# Patient Record
Sex: FEMALE
[De-identification: names, ages, dates, MRNs, and addresses within clinical notes are randomized per-mention and may not be internally consistent; named-entity substitution may affect disease eponyms.]

---

## 2021-11-11 ENCOUNTER — HOSPITAL ENCOUNTER (EMERGENCY)
Dept: HOSPITAL 97 - ER | Age: 26
Discharge: HOME | End: 2021-11-11
Payer: SELF-PAY

## 2021-11-11 VITALS — DIASTOLIC BLOOD PRESSURE: 58 MMHG | SYSTOLIC BLOOD PRESSURE: 111 MMHG | TEMPERATURE: 98.6 F

## 2021-11-11 VITALS — OXYGEN SATURATION: 100 %

## 2021-11-11 DIAGNOSIS — O20.0: Primary | ICD-10-CM

## 2021-11-11 DIAGNOSIS — Z3A.01: ICD-10-CM

## 2021-11-11 LAB
BUN BLD-MCNC: 4 MG/DL (ref 7–18)
GLUCOSE SERPLBLD-MCNC: 92 MG/DL (ref 74–106)
HCG SERPL-ACNC: (no result) MIU/ML (ref 1–3)
HCT VFR BLD CALC: 36.4 % (ref 36–45)
LYMPHOCYTES # SPEC AUTO: 1.9 K/UL (ref 0.7–4.9)
PMV BLD: 9.8 FL (ref 7.6–11.3)
POTASSIUM SERPL-SCNC: 3.4 MMOL/L (ref 3.5–5.1)
RBC # BLD: 3.82 M/UL (ref 3.86–4.86)
SP GR UR: 1.01 (ref 1–1.03)

## 2021-11-11 PROCEDURE — 81003 URINALYSIS AUTO W/O SCOPE: CPT

## 2021-11-11 PROCEDURE — 99284 EMERGENCY DEPT VISIT MOD MDM: CPT

## 2021-11-11 PROCEDURE — 36415 COLL VENOUS BLD VENIPUNCTURE: CPT

## 2021-11-11 PROCEDURE — 76817 TRANSVAGINAL US OBSTETRIC: CPT

## 2021-11-11 PROCEDURE — 80048 BASIC METABOLIC PNL TOTAL CA: CPT

## 2021-11-11 PROCEDURE — 86900 BLOOD TYPING SEROLOGIC ABO: CPT

## 2021-11-11 PROCEDURE — 86901 BLOOD TYPING SEROLOGIC RH(D): CPT

## 2021-11-11 PROCEDURE — 85025 COMPLETE CBC W/AUTO DIFF WBC: CPT

## 2021-11-11 PROCEDURE — 84702 CHORIONIC GONADOTROPIN TEST: CPT

## 2021-11-11 PROCEDURE — 81025 URINE PREGNANCY TEST: CPT

## 2021-11-11 NOTE — EDPHYS
Physician Documentation                                                                           

 [facility]                                                                 

Name: Ann Marie Hernandez                                                                           

Age: 26 yrs                                                                                       

Sex: Female                                                                                       

: 1995                                                                                   

MRN: [id number]                                                                                   

Arrival Date: 2021                                                                          

Time: 12:05                                                                                       

Account#: [id number]                                                                            

Bed 17                                                                                            

Private MD:                                                                                       

ED Physician Ivana Fierro                                                                    

HPI:                                                                                              

                                                                                             

15:32 This 26 yrs old  Female presents to ER via Ambulatory with complaints of Pelvic kb  

      Pain, Abdominal Pain.                                                                       

15:34 The patient presents to the emergency department with abdominal pain, of the left lower kb  

      quadrant, that started at the beginning of pregnancy, described as constant. Pregnancy      

      course: Prenatal care: at a clinic. Previous pregnancies: in previous pregnancies           

      patient has had. Associated signs and symptoms: Pertinent positives: abdominal pain.        

      The patient has not experienced similar symptoms in the past. The patient has been          

      recently seen by a physician:.                                                              

                                                                                                  

OB/GYN:                                                                                           

15:34  3,  0, Living 2, LMP 2021                                          kb  

                                                                                                  

Historical:                                                                                       

- Allergies:                                                                                      

12:34 No Known Allergies;                                                                     ld1 

- Home Meds:                                                                                      

12:34 None [Active];                                                                          ld1 

- PMHx:                                                                                           

12:34 None;                                                                                   ld1 

- PSHx:                                                                                           

12:34 None;                                                                                   ld1 

                                                                                                  

- Immunization history:: Adult Immunizations up to date, Client reports having NOT                

  received the Covid vaccine.                                                                     

- Social history:: Smoking status: Patient denies any tobacco usage or history of.                

  Patient/guardian denies using alcohol.                                                          

                                                                                                  

                                                                                                  

ROS:                                                                                              

15:33 Constitutional: Negative for fever, chills, and weight loss.                            kb  

15:33 Abdomen/GI: Positive for abdominal pain, Negative for nausea, vomiting, and diarrhea.       

15:33 All other systems are negative.                                                             

                                                                                                  

Exam:                                                                                             

15:33 Constitutional:  This is a well developed, well nourished patient who is awake, alert,  kb  

      and in no acute distress. Head/Face:  Normocephalic, atraumatic. ENT:  Moist Mucous         

      membranes Respiratory:  Respirations even and unlabored. No increased work of               

      breathing, no retractions or nasal flaring. Skin:  Warm, dry with normal turgor.            

      Normal color. MS/ Extremity:  Pulses equal, no cyanosis.  Neurovascular intact.  Full,      

      normal range of motion. Neuro:  Awake and alert, GCS 15, oriented to person, place,         

      time, and situation. Moves all extremities. Normal gait. Psych:  Awake, alert, with         

      orientation to person, place and time.  Behavior, mood, and affect are within normal        

      limits.                                                                                     

15:33 Abdomen/GI: Inspection: abdomen appears normal, Bowel sounds: normal, Palpation: soft,      

      in all quadrants, moderate abdominal tenderness, in the left lower quadrant.                

                                                                                                  

Vital Signs:                                                                                      

12:32  / 78; Pulse 83; Resp 18; Temp 98.3(TE); Pulse Ox 100% on R/A; Weight 45.36 kg;   ld1 

      Height 5 ft. 0 in. (152.40 cm); Pain 10/10;                                                 

12:40  / 76; Pulse 80; Resp 18; Pulse Ox 100% ;                                         sl2 

14:30  / 58; Pulse 83; Resp 18; Temp 98.6; Pulse Ox 100% ;                              sl2 

12:32 Body Mass Index 19.53 (45.36 kg, 152.40 cm)                                             ld1 

                                                                                                  

MDM:                                                                                              

12:44 Patient medically screened.                                                             kb  

15:33 Data reviewed: vital signs, nurses notes. Data interpreted: Pulse oximetry: on room air kb  

      is 100 %. Interpretation: normal. Counseling: I had a detailed discussion with the          

      patient and/or guardian regarding: the historical points, exam findings, and any            

      diagnostic results supporting the discharge/admit diagnosis, lab results, radiology         

      results, the need for outpatient follow up, an OB/Gyne specialist, to return to the         

      emergency department if symptoms worsen or persist or if there are any questions or         

      concerns that arise at home. ED course: Educated to follow up for repeat HCG..              

                                                                                                  

                                                                                             

12:44 Order name: Abo/rh Typing; Complete Time: 14:59                                         kb  

                                                                                             

12:44 Order name: Basic Metabolic Panel; Complete Time: 15:13                                 kb  

                                                                                             

12:44 Order name: CBC with Diff; Complete Time: 14:43                                         kb  

                                                                                             

12:44 Order name: Quantitative Hcg; Complete Time: 15:13                                      kb  

                                                                                             

13:00 Order name: Urine Dipstick-Ancillary; Complete Time: 13:02                              EDMS

                                                                                             

13:02 Order name: Urine Pregnancy--Ancillary (enter results); Complete Time: 13:11            eb  

                                                                                             

12:08 Order name: Urine Dipstick-Ancillary (obtain specimen); Complete Time: 14:20            kb  

                                                                                             

12:08 Order name: Urine Pregnancy Test (obtain specimen); Complete Time: 14:20                kb  

                                                                                             

12:44 Order name: IV Saline Lock; Complete Time: 14:19                                        kb  

                                                                                             

12:44 Order name: Labs collected and sent; Complete Time: 14:19                               kb  

                                                                                             

12:44 Order name: NPO; Complete Time: 14:20                                                   kb  

                                                                                             

12:44 Order name: US Transvaginal Ob; Complete Time: 14:17                                    kb  

                                                                                                  

Administered Medications:                                                                         

No medications were administered                                                                  

                                                                                                  

                                                                                                  

Disposition Summary:                                                                              

21 15:22                                                                                    

Discharge Ordered                                                                                 

      Location: Home                                                                          kb  

      Condition: Stable                                                                       kb  

      Diagnosis                                                                                   

        - Threatened                                                                  kb  

      Followup:                                                                               kb  

        - With: Emergency Department                                                               

        - When: As needed                                                                          

        - Reason: Worsening of condition                                                           

      Followup:                                                                               kb  

        - With: Private Physician                                                                  

        - When: 2 - 3 days                                                                         

        - Reason: Recheck today's complaints, Continuance of care, Re-evaluation by your           

      physician                                                                                   

      Discharge Instructions:                                                                     

        - Discharge Summary Sheet                                                             kb  

        - Threatened Miscarriage, Easy-to-Read                                                kb  

      Forms:                                                                                      

        - Medication Reconciliation Form                                                      kb  

        - Thank You Letter                                                                    kb  

        - Antibiotic Education                                                                kb  

        - Prescription Opioid Use                                                             kb  

Addendum:                                                                                         

11/15/2021                                                                                        

     06:39 Co-signature as Attending Physician, Ivana mooney
a2

                                                                                                  

Signatures:                                                                                       

Dispatcher [employer]                           Aida Kramer, SLOAN-C                 SLOAN-Ivana Mittal MD MD   ma2                                                  

Susie Breaux, RN                     RN   ld1                                                  

                                                                                                  

**************************************************************************************************

## 2021-11-11 NOTE — RAD REPORT
EXAM DESCRIPTION:  US - Transvaginal OB - 11/11/2021 1:41 pm

 

CLINICAL HISTORY:  ABD PAIN

 

COMPARISON:  No comparisons

 

FINDINGS:  Normal shaped intrauterine gestational sac is identified in the fundal portion of the endo
metrial cavity. Yolk sac is present. There is no fetal pole. The gestational sac measurements corresp
ond to a 6 week 4 day gestation. A small subchorionic hemorrhage is present.

 

Both ovaries are identifiable and show normal blood flow within the stroma. No ovarian or adnexal mas
s is identifiable. No suspicion for ectopic pregnancy.

 

No blood or fluid in the cul de sac.

 

IMPRESSION:  Six week 4 day sized gestational sac with yolk sac. There is no fetal pole present.

 

No adnexal abnormality to suspect ectopic pregnancy.

 

Miscarriage is most likely given the gestational sac size and absence of fetal pole. Repeat imaging c
ould be performed if there are elevating beta HCG values.

## 2021-11-11 NOTE — ER
Nurse's Notes                                                                                     

 Former[facility]                                                                 

Name: Ann Marie Hernandez                                                                           

Age: 26 yrs                                                                                       

Sex: Female                                                                                       

: 1995                                                                                   

MRN: [id number]                                                                                   

Arrival Date: 2021                                                                          

Time: 12:05                                                                                       

Account#: [id number]                                                                            

Bed 17                                                                                            

Private MD:                                                                                       

Diagnosis: Threatened                                                                     

                                                                                                  

Presentation:                                                                                     

                                                                                             

12:32 Chief complaint: Patient states: 7 weeks pregnant, LLQ pain. PT went to clinic,         ld1 

      received ultrasound - patient stated that the "baby was not seen where it was supposed      

      to be." Level 10 pain. Coronavirus screen: At this time, the client does not indicate       

      any symptoms associated with coronavirus-19. Ebola Screen: No symptoms or risks             

      identified at this time. Initial Sepsis Screen: Does the patient meet any 2 criteria?       

      No. Patient's initial sepsis screen is negative. Does the patient have a suspected          

      source of infection? No. Patient's initial sepsis screen is negative. Risk Assessment:      

      Do you want to hurt yourself or someone else? Patient reports no desire to harm self or     

      others. Onset of symptoms was 2021.                                            

12:32 Method Of Arrival: Ambulatory                                                           ld1 

12:32 Acuity: CHACHA 3                                                                           ld1 

                                                                                                  

Triage Assessment:                                                                                

12:34 General: Appears in no apparent distress. comfortable. General: Appears Behavior is     ld1 

      calm, cooperative, appropriate for age. Pain: Complains of pain in left lower quadrant      

      Pain does not radiate. Pain currently is 10 out of 10 on a pain scale. Quality of pain      

      is described as sharp, stabbing, throbbing, Pain began suddenly, Is continuous. EENT:       

      No deficits noted. Neuro: Level of Consciousness is awake, alert, obeys commands,           

      Oriented to person, place, time, situation, Appropriate for age. Cardiovascular:            

      Capillary refill < 3 seconds Patient's skin is warm and dry. Respiratory: Airway is         

      patent Respiratory effort is even, unlabored, Respiratory pattern is regular,               

      symmetrical. GI: Abdomen is flat, non-distended, Reports lower abdominal pain.              

                                                                                                  

OB/GYN:                                                                                           

15:34  3,  0, Living 2, LMP 2021                                          kb  

                                                                                                  

Historical:                                                                                       

- Allergies:                                                                                      

12:34 No Known Allergies;                                                                     ld1 

- Home Meds:                                                                                      

12:34 None [Active];                                                                          ld1 

- PMHx:                                                                                           

12:34 None;                                                                                   ld1 

- PSHx:                                                                                           

12:34 None;                                                                                   ld1 

                                                                                                  

- Immunization history:: Adult Immunizations up to date, Client reports having NOT                

  received the Covid vaccine.                                                                     

- Social history:: Smoking status: Patient denies any tobacco usage or history of.                

  Patient/guardian denies using alcohol.                                                          

                                                                                                  

                                                                                                  

Assessment:                                                                                       

12:42 General: Appears in no apparent distress. well groomed, well developed, Behavior is     sl2 

      calm, cooperative, appropriate for age.                                                     

12:42 Pain: Denies pain. Neuro: No deficits noted. Cardiovascular: No deficits noted.         sl2 

      Respiratory: No deficits noted. Airway is patent Trachea midline Respiratory effort is      

      even, unlabored, Respiratory pattern is regular, symmetrical. GI: No deficits noted. No     

      signs and/or symptoms were reported involving the gastrointestinal system. Bowel sounds     

      present X 4 quads. Abd is soft and non tender X 4 quads. : No deficits noted. No          

      signs and/or symptoms were reported regarding the genitourinary system. EENT: No            

      deficits noted. No signs and/or symptoms were reported regarding the EENT system. Derm:     

      No deficits noted. No signs and/or symptoms reported regarding the dermatologic system.     

      Musculoskeletal: No deficits noted. No signs and/or symptoms reported regarding the         

      musculoskeletal system.                                                                     

                                                                                                  

Vital Signs:                                                                                      

12:32  / 78; Pulse 83; Resp 18; Temp 98.3(TE); Pulse Ox 100% on R/A; Weight 45.36 kg;   ld1 

      Height 5 ft. 0 in. (152.40 cm); Pain 10/10;                                                 

12:40  / 76; Pulse 80; Resp 18; Pulse Ox 100% ;                                         sl2 

14:30  / 58; Pulse 83; Resp 18; Temp 98.6; Pulse Ox 100% ;                              sl2 

12:32 Body Mass Index 19.53 (45.36 kg, 152.40 cm)                                             ld1 

                                                                                                  

ED Course:                                                                                        

12:05 Patient arrived in ED.                                                                  am2 

12:07 [facility]                                                        kb  

12:07 Ivana Fierro MD is Attending Physician.                                           kb  

12:34 Triage completed.                                                                       ld1 

12:34 Arm band placed on right wrist.                                                         ld1 

12:52 Kinga Kilpatrick, RN is Primary Nurse.                                                   sl2 

13:13 Patient taken to ultrasound.                                                            sl2 

13:42 US Transvaginal Ob In Process Unspecified.                                              EDMS

14:20 Abo/rh Typing Sent.                                                                     [facility] 

14:20 Basic Metabolic Panel Sent.                                                             [facility] 

14:20 CBC with Diff Sent.                                                                     [facility] 

14:20 Quantitative Hcg Sent.                                                                  [facility] 

14:20 Initial lab(s) drawn, by me, sent to lab. T\T\S collected, blood band applied to patient. [facility]

      Inserted saline lock: 22 gauge in left antecubital area, using aseptic technique. Blood     

      collected.                                                                                  

                                                                                                  

Administered Medications:                                                                         

No medications were administered                                                                  

                                                                                                  

                                                                                                  

Outcome:                                                                                          

15: Discharge ordered by MD.                                                                kb  

16:03 Patient left the ED.                                                                    ld1 

                                                                                                  

Signatures:                                                                                       

Dispatcher [employer]                           [facility]                                                 

Aida Kent, JORDANA                 FNP-Ann Marie Wallace                              5                                                  

Ernestine Beltre Lauren, RN                     RN   ld1                                                  

Kinga Kilpatrick RN                     RN   sl2                                                  

                                                                                                  

**************************************************************************************************

## 2021-12-13 ENCOUNTER — HOSPITAL ENCOUNTER (EMERGENCY)
Dept: HOSPITAL 97 - ER | Age: 26
Discharge: HOME | End: 2021-12-13
Payer: SELF-PAY

## 2021-12-13 VITALS — DIASTOLIC BLOOD PRESSURE: 63 MMHG | SYSTOLIC BLOOD PRESSURE: 112 MMHG

## 2021-12-13 VITALS — OXYGEN SATURATION: 100 % | TEMPERATURE: 99.1 F

## 2021-12-13 DIAGNOSIS — O20.0: Primary | ICD-10-CM

## 2021-12-13 LAB
BUN BLD-MCNC: 6 MG/DL (ref 7–18)
GLUCOSE SERPLBLD-MCNC: 102 MG/DL (ref 74–106)
HCG SERPL-ACNC: (no result) MIU/ML (ref 1–3)
HCT VFR BLD CALC: 40 % (ref 36–45)
LYMPHOCYTES # SPEC AUTO: 2.5 K/UL (ref 0.7–4.9)
PMV BLD: 9.8 FL (ref 7.6–11.3)
POTASSIUM SERPL-SCNC: 3.1 MMOL/L (ref 3.5–5.1)
RBC # BLD: 4.21 M/UL (ref 3.86–4.86)
SP GR UR: >1.03 (ref 1–1.03)

## 2021-12-13 PROCEDURE — 81015 MICROSCOPIC EXAM OF URINE: CPT

## 2021-12-13 PROCEDURE — 36415 COLL VENOUS BLD VENIPUNCTURE: CPT

## 2021-12-13 PROCEDURE — 80048 BASIC METABOLIC PNL TOTAL CA: CPT

## 2021-12-13 PROCEDURE — 86901 BLOOD TYPING SEROLOGIC RH(D): CPT

## 2021-12-13 PROCEDURE — 86900 BLOOD TYPING SEROLOGIC ABO: CPT

## 2021-12-13 PROCEDURE — 76817 TRANSVAGINAL US OBSTETRIC: CPT

## 2021-12-13 PROCEDURE — 84702 CHORIONIC GONADOTROPIN TEST: CPT

## 2021-12-13 PROCEDURE — 81025 URINE PREGNANCY TEST: CPT

## 2021-12-13 PROCEDURE — 85025 COMPLETE CBC W/AUTO DIFF WBC: CPT

## 2021-12-13 PROCEDURE — 81003 URINALYSIS AUTO W/O SCOPE: CPT

## 2021-12-13 PROCEDURE — 99284 EMERGENCY DEPT VISIT MOD MDM: CPT

## 2021-12-13 NOTE — ER
Nurse's Notes                                                                                     

 [facility]                                                                 

Name: Ann Marie Hernandez                                                                           

Age: 26 yrs                                                                                       

Sex: Female                                                                                       

: 1995                                                                                   

MRN: [id number]                                                                                   

Arrival Date: 2021                                                                          

Time: 13:35                                                                                       

Account#: [id number]                                                                            

Bed 20                                                                                            

Private MD:                                                                                       

Diagnosis: Threatened ;Abnormal uterine and vaginal bleeding, unspecified                 

                                                                                                  

Presentation:                                                                                     

                                                                                             

14:30 Chief complaint: Patient states: spotting 3 days ago, cramps in lower back are really   [facility] 

      bad now. Coronavirus screen: Vaccine status: Patient reports being unvaccinated. Ebola      

      Screen: Patient negative for fever greater than or equal to 101.5 degrees Fahrenheit,       

      and additional compatible Ebola Virus Disease symptoms Patient denies exposure to           

      infectious person. Patient denies travel to an Ebola-affected area in the 21 days           

      before illness onset. Initial Sepsis Screen: Does the patient meet any 2 criteria? No.      

      Patient's initial sepsis screen is negative. Does the patient have a suspected source       

      of infection? No. Patient's initial sepsis screen is negative. Risk Assessment: Do you      

      want to hurt yourself or someone else? Patient reports no desire to harm self or            

      others. Onset of symptoms was December 10, 2021.                                            

14:30 Method Of Arrival: Ambulatory                                                           [facility] 

14:30 Acuity: CHACHA 3                                                                           [facility] 

                                                                                                  

Triage Assessment:                                                                                

14:34 General: Appears uncomfortable, slender, Behavior is calm, cooperative, appropriate for [facility] 

      age. Pain: Complains of pain in back.                                                       

                                                                                                  

OB/GYN:                                                                                           

15:22  3, Full Term 2, Premature 0,  0, Living 2                               rn  

                                                                                                  

Historical:                                                                                       

- Allergies:                                                                                      

14:34 No Known Allergies;                                                                     [facility] 

- Home Meds:                                                                                      

14:34 None [Active];                                                                          [facility] 

- PMHx:                                                                                           

14:34 None;                                                                                   [facility] 

                                                                                                  

- Immunization history:: Adult Immunizations up to date.                                          

- Social history:: Smoking status: Patient denies any tobacco usage or history of.                

- Family history:: not pertinent.                                                                 

- Hospitalizations: : No recent hospitalization is reported.                                      

                                                                                                  

                                                                                                  

Screening:                                                                                        

15:29 Abuse screen: Denies threats or abuse. Nutritional screening: No deficits noted.        [facility] 

      Tuberculosis screening: No symptoms or risk factors identified. Fall Risk None              

      identified.                                                                                 

                                                                                                  

Assessment:                                                                                       

15:27 General: Appears in no apparent distress. slender, well groomed, well developed, well   jh6 

      nourished, Behavior is calm. Pain: Complains of pain in right lower quadrant and left       

      lower quadrant Pain currently is 6 out of 10 on a pain scale. : Reports vaginal           

      bleeding that is with clots, moderate flow.                                                 

16:33 Reassessment: No changes from previously documented assessment. Patient and/or family   jh6 

      updated on plan of care and expected duration. Pain level reassessed. Patient is alert,     

      oriented x 3, equal unlabored respirations, skin warm/dry/pink.                             

                                                                                                  

Vital Signs:                                                                                      

14:30  / 77; Pulse 90; Resp 18; Temp 99.1; Pulse Ox 100% ; Height 5 ft. 4 in. (162.56   jh5 

      cm);                                                                                        

15:28  / 74; Pulse 76; Resp 18; Pulse Ox 100% ;                                         jh6 

16:34  / 70; Pulse 74; Resp 17; Pulse Ox 100% ;                                         jh6 

17:30  / 63; Pulse 70; Resp 17; Pulse Ox 100% ;                                         jh6 

                                                                                                  

ED Course:                                                                                        

13:35 Patient arrived in ED.                                                                  ds1 

14:34 Triage completed.                                                                       5 

15:05 Kun Breen MD is Attending Physician.                                                rn  

15:27 Ivonne Davis RN is Primary Nurse.                                                 jh6 

15:28 Inserted saline lock: 22 gauge in right forearm, using aseptic technique.               jh6 

15:28 Call light in reach. Side rails up X 1. Door closed. Noise minimized.                   jh6 

15:35 Abo/rh Typing Sent.                                                                     5 

15:35 Basic Metabolic Panel Sent.                                                             5 

15:35 CBC with Diff Sent.                                                                     5 

15:35 Quantitative Hcg Sent.                                                                  5 

15:35 Pulse ox on. NIBP on.                                                                   5 

15:36 Initial lab(s) drawn, by me, sent to lab. Urine collected: clean catch specimen, clear. mh5 

      Missed attempt(s): 22 gauge in right forearm. antecubital area.                             

15:40 US Transvaginal Ob Sent.                                                                jh6 

15:41 Arm band placed on.                                                                     jh6 

15:45 Urine Microscopic Only Sent.                                                            mh5 

15:45 Abo/rh Typing Sent.                                                                     mh5 

15:45 Basic Metabolic Panel Sent.                                                             5 

15:45 Quantitative Hcg Sent.                                                                  5 

15:55 US Transvaginal Ob In Process Unspecified.                                              EDMS

16:01 Inserted saline lock: 22 gauge in left antecubital area, using aseptic technique.       jh6 

17:05 Urine Pregnancy--Ancillary (enter results) Sent.                                        [facility] 

17:52 IV discontinued, intact, bleeding controlled, No redness/swelling at site. Pressure     [facility] 

      dressing applied.                                                                           

17:52 No provider procedures requiring assistance completed.                                  6 

                                                                                                  

Administered Medications:                                                                         

No medications were administered                                                                  

                                                                                                  

                                                                                                  

Outcome:                                                                                          

17:32 Discharge ordered by MD.                                                                rn  

17:52 Discharged to home ambulatory.                                                          [facility] 

17:52 Condition: good                                                                             

17:52 Discharge instructions given to patient, Instructed on discharge instructions, follow       

      up and referral plans. Demonstrated understanding of instructions, follow-up care.          

17:52 Patient left the ED.                                                                    6 

                                                                                                  

Signatures:                                                                                       

Dispatcher [employer]                           EDMS                                                 

Patricia Wei                                ds1                                                  

Kun Breen MD MD rn Martinez, Maria                              Chaparrita Arellano RN                       RN   5                                                  

Ivonne Davis RN                   RN   6                                                  

                                                                                                  

**************************************************************************************************

## 2021-12-13 NOTE — RAD REPORT
EXAM DESCRIPTION:  US - Transvaginal OB - 12/13/2021 3:55 pm

 

CLINICAL HISTORY:  back pain, vaginal bleeding

 

COMPARISON:  Transvaginal OB dated 11/11/2021

 

FINDINGS:  There is a gestational sac noted which is fairly large sized in crescentic.

No embryo or yolk sac is seen within the gestational sac. This would favor blighted ovum. HCG correla
tion recommended.

 

The maternal adnexa and ovaries are within normal limits. Normal Doppler blood flow was demonstrated 
to both ovaries.

 

 

IMPRESSION:  Suspected blighted ovum. Advise correlation with HCG levels. If clinically desired, a fo
llow-up ultrasound in 1 week could be obtained to confirm.

## 2021-12-13 NOTE — EDPHYS
Physician Documentation                                                                           

 [facility]                                                                 

Name: Ann Marie Hernandez                                                                           

Age: 26 yrs                                                                                       

Sex: Female                                                                                       

: 1995                                                                                   

MRN: [id number]                                                                                   

Arrival Date: 2021                                                                          

Time: 13:35                                                                                       

Account#: [id number]                                                                            

Bed 20                                                                                            

Private MD:                                                                                       

ED Physician Kun Breen                                                                         

HPI:                                                                                              

                                                                                             

15:22 This 26 yrs old  Female presents to ER via Ambulatory with complaints of 9 Wks  rn  

      Preg Vag Bleeding w/ Pain.                                                                  

15:22 The patient presents to the emergency department with vaginal bleeding, that is         rn  

      moderate. The estimated gestational age is 9 weeks. Pregnancy course: Prenatal care: at     

      a clinic, Leakage of Fluid: none appreciated, Ultrasound: the patient had an                

      ultrasound, which was normal. Previous pregnancies: in previous pregnancies patient has     

      had. Associated signs and symptoms: Pertinent positives: vaginal bleeding, Pertinent        

      negatives: abdominal pain, fever, frequency. The patient has not experienced similar        

      symptoms in the past. The patient has not recently seen a physician. Patient reports        

      low back pain and vaginal bleeding, started mild this past week but now heavier than a      

      normal period. G3, P2. Had an ultrasound of this pregnancy but could not visualize a        

      heartbeat the first time, has been a few weeks. Denies any trauma..                         

                                                                                                  

OB/GYN:                                                                                           

15:22  3, Full Term 2, Premature 0,  0, Living 2                               rn  

                                                                                                  

Historical:                                                                                       

- Allergies:                                                                                      

14:34 No Known Allergies;                                                                     [facility] 

- Home Meds:                                                                                      

14:34 None [Active];                                                                          [facility] 

- PMHx:                                                                                           

14:34 None;                                                                                   [facility] 

                                                                                                  

- Immunization history:: Adult Immunizations up to date.                                          

- Social history:: Smoking status: Patient denies any tobacco usage or history of.                

- Family history:: not pertinent.                                                                 

- Hospitalizations: : No recent hospitalization is reported.                                      

                                                                                                  

                                                                                                  

ROS:                                                                                              

15:22 Constitutional: Negative for fever, chills, and weight loss, Eyes: Negative for injury, rn  

      pain, redness, and discharge, Neck: Negative for injury, pain, and swelling,                

      Cardiovascular: Negative for chest pain, palpitations, and edema, Respiratory: Negative     

      for shortness of breath, cough, wheezing, and pleuritic chest pain, Abdomen/GI:             

      Negative for abdominal pain, nausea, vomiting, diarrhea, and constipation, Back:            

      Positive for low back pain : Positive for vaginal bleeding MS/Extremity: Negative for     

      injury and deformity, Skin: Negative for injury, rash, and discoloration, Neuro:            

      Negative for headache, weakness, numbness, tingling, and seizure.                           

                                                                                                  

Exam:                                                                                             

15:22 Constitutional:  This is a well developed, well nourished patient who is awake, alert,  rn  

      and in no acute distress.  Ambulatory to room without difficulty or assistance              

      Head/Face:  Normocephalic, atraumatic. Eyes:  Periorbital areas with no swelling,           

      redness, or edema. Cardiovascular:  Regular rate and rhythm.  No pulse deficits.            

      Respiratory:  No increased work of breathing, no retractions or nasal flaring.              

      Abdomen/GI:  Soft, non-tender Skin:  Warm, dry MS/ Extremity:  Pulses equal, no             

      cyanosis.  Neuro:  Awake and alert, GCS 15                                                  

                                                                                                  

Vital Signs:                                                                                      

14:30  / 77; Pulse 90; Resp 18; Temp 99.1; Pulse Ox 100% ; Height 5 ft. 4 in. (162.56   jh5 

      cm);                                                                                        

15:28  / 74; Pulse 76; Resp 18; Pulse Ox 100% ;                                         jh6 

16:34  / 70; Pulse 74; Resp 17; Pulse Ox 100% ;                                         jh6 

17:30  / 63; Pulse 70; Resp 17; Pulse Ox 100% ;                                         jh6 

                                                                                                  

MDM:                                                                                              

15:05 Patient medically screened.                                                             rn  

17:30 Differential diagnosis: Data reviewed: vital signs, nurses notes, old medical records,  rn  

      lab test result(s), radiologic studies, and as a result, I will discharge patient.          

      Counseling: I had a detailed discussion with the patient and/or guardian regarding: the     

      historical points, exam findings, and any diagnostic results supporting the                 

      discharge/admit diagnosis, lab results, radiology results, the need for outpatient          

      follow up, to return to the emergency department if symptoms worsen or persist or if        

      there are any questions or concerns that arise at home. Special discussion: I discussed     

      with the patient/guardian in detail that at this point there is no indication for           

      admission to the hospital. It is understood, however, that if the symptoms persist or       

      worsen the patient needs to return immediately for re-evaluation. Based on the history      

      and exam findings, there is no indication for further emergent testing or inpatient         

      evaluation. I discussed with the patient/guardian the need to see the OB Gyne               

      specialist for further evaluation of the symptoms. ED course: Patient with elevated         

      beta-hCG, ultrasound shows possible miscarriage versus blighted ovum, negative urine,       

      Rh+, will DC home with OB follow-up for repeat ultrasound and exam and repeat beta.         

      Spoke with patient and told her most likely miscarriage but will DC home with               

      supportive care and return precautions..                                                    

                                                                                                  

                                                                                             

14:42 Order name: Abo/rh Typing; Complete Time: 16:19                                         pm1 

                                                                                             

14:42 Order name: Basic Metabolic Panel; Complete Time: 16:25                                 pm1 

                                                                                             

14:42 Order name: CBC with Diff; Complete Time: 15:44                                         pm1 

                                                                                             

14:42 Order name: Quantitative Hcg; Complete Time: 16:25                                      pm1 

                                                                                             

15:14 Order name: Urine Microscopic Only; Complete Time: 17:30                                rn  

                                                                                             

15:44 Order name: Urine Dipstick-Ancillary; Complete Time: 16:06                              EDMS

                                                                                             

14:42 Order name: IV Saline Lock; Complete Time: 15:32                                        pm1 

                                                                                             

14:42 Order name: Labs collected and sent; Complete Time: 15:32                               pm1 

                                                                                             

14:42 Order name: NPO; Complete Time: 15:32                                                   pm1 

                                                                                             

14:42 Order name: Urine Dipstick-Ancillary (obtain specimen); Complete Time: 15:35            pm1 

                                                                                             

15:14 Order name: US Transvaginal Ob; Complete Time: 16:06                                    rn  

                                                                                             

15:48 Order name: Urine Pregnancy--Ancillary (enter results)                                  bd  

                                                                                             

15:49 Order name: Urine Pregnancy--Ancillary; Complete Time: 16:06                            EDMS

                                                                                                  

Administered Medications:                                                                         

No medications were administered                                                                  

                                                                                                  

                                                                                                  

Disposition Summary:                                                                              

21 17:32                                                                                    

Discharge Ordered                                                                                 

      Location: Home                                                                          rn  

      Problem: new                                                                            rn  

      Symptoms: have improved                                                                 rn  

      Condition: Stable                                                                       rn  

      Diagnosis                                                                                   

        - Threatened                                                                  rn  

        - Abnormal uterine and vaginal bleeding, unspecified                                  rn  

      Followup:                                                                               rn  

        - With: Private Physician                                                                  

        - When: 48 Hours                                                                           

        - Reason: Recheck today's complaints, Continuance of care, Repeat Beta-HCG (48             

      Hours), Re-evaluation by your physician                                                     

      Discharge Instructions:                                                                     

        - Discharge Summary Sheet                                                             rn  

        - Threatened Miscarriage                                                              rn  

        - Vaginal Bleeding During Pregnancy, First Trimester                                  rn  

      Forms:                                                                                      

        - Medication Reconciliation Form                                                      rn  

        - Thank You Letter                                                                    rn  

        - Antibiotic Education                                                                rn  

        - Prescription Opioid Use                                                             rn  

Signatures:                                                                                       

Dispatcher [employer]                           EDMS                                                 

Kun Breen MD MD rn Marinas, Patrick NP                    NP   pm1                                                  

Chaparrita Pacheco, RN                       RN   jh5                                                  

                                                                                                  

**************************************************************************************************

## 2021-12-13 NOTE — XMS REPORT
Continuity of Care Document

                          Created on:2021



Patient:SHAKILA ANDRADE

Sex:Female

:1995

External Reference #:[id number]





Demographics







                          Address                   [address] [place] [address]

 

                          Home Phone                [phone]

 

                          Mobile Phone              [phone]

 

                          Email Address             NONE

 

                          Preferred Language        spa

 

                          Marital Status            Unknown

 

                          Episcopalian Affiliation     Unknown

 

                          Race                      Unknown

 

                          Additional Race(s)        Unavailable

 

                          Ethnic Group              Unknown









Author







                          Organization              [facility]

t

 

                          Address                   [address]



                                                    [place] [address]

 

                          Phone                     [phone]









Support







                Name            Relationship    Address         Phone

 

                LUIS           Relative        Unavailable     [phone]









Care Team Providers







                    Name                Role                Phone

 

                    PCP,  DOES NOT HAVE A Primary Care Physician Unavailable

 

                    JORGE MERRITT        Attending Clinician Unavailable

 

                    Doctor Unassigned,  Name Attending Clinician Unavailable

 

                    BRYAN Freeman Attending Clinician [phone]

 

                    Provider,  Temp     Attending Clinician Unavailable

 

                    BRYAN ACOSTA       Attending Clinician Unavailable









Payers







           Payer Name Policy Type Policy Number Effective Date Expiration Date S

ource

 

           CH[employer]IV PENDING            PENDING    2021 00:00:00          

  







Problems







       Condition Condition Condition Status Onset  Resolution Last   Treating Co

mments 

Source



       Name   Details Category        Date   Date   Treatment Clinician        



                                                 Date                 

 

       No known No known Disease                                           Unive

rs



       active active                                                  ity of



       problems problems                                                  [facility]







Allergies, Adverse Reactions, Alerts







       Allergy Allergy Status Severity Reaction(s) Onset  Inactive Treating Comm

ents 

Source



       Name   Type                        Date   Date   Clinician        

 

       NO KNOWN Drug   Active                                           [facility]



       ALLERGIE Class                                                   ity of



       S                                                              [facility]







Social History







           Social Habit Start Date Stop Date  Quantity   Comments   Source

 

           ASSERTION  2021-10-07            Pregnant              [facility]



                      00:00:00                                    [facility]

 

           Tobacco use and 2021 Never used            [facility]

y of



           exposure   00:00:00   00:00:00                         [facility]

 

           Alcohol intake 2021 Ex-drinker            [facility]



                      00:00:00   00:00:00   (finding)             [facility]

 

           Sex Assigned At 1995                       [facility]

y of



           Birth      00:00:00   00:00:00                         [facility]









                Smoking Status  Start Date      Stop Date       Source

 

                Never smoker                                    [facility]







Medications







       Ordered Filled Start  Stop   Current Ordering Indication Dosage Frequency

 Signature

                    Comments            Components          Source



     Medication Medication Date Date Medication? Clinician                (SIG) 

          



     Name Name                                                   

 

     prenatal      2021      Yes       [id number] 1{tbl}      Take 1           U

nivers



     multivitami      1-23                               tablet by           ity

 of



     n (PRENATAL      00:00:                               mouth           [place]



     VITAMIN)      00                                 daily.           Medical



     tablet                                                        Branch

 

     proMETHazin      2021      Yes       [id number] 12.5mg      Take 0.5       

    Univers



     e 25 mg      1-23                               tablets by           ity of



     tablet      00:00:                               mouth           [place]



               00                                 every 4           Medical



                                                  (four)           Branch



                                                  hours as           



                                                  needed for           



                                                  Nausea and           



                                                  Vomiting           



                                                  (N/V).           

 

     prenatal      2021      Yes       [id number] 1{tbl}      Take 1           U

nivers



     multivitami      1-23                               tablet by           ity

 of



     n (PRENATAL      00:00:                               mouth           [place]



     VITAMIN)      00                                 daily.           Medical



     tablet                                                        Branch

 

     proMETHazin      2021      Yes       [id number] 12.5mg      Take 0.5       

    [facility]



     e 25 mg      1-23                               tablets by           ity of



     tablet      00:00:                               mouth           [place]



               00                                 every 4           Medical



                                                  (four)           Branch



                                                  hours as           



                                                  needed for           



                                                  Nausea and           



                                                  Vomiting           



                                                  (N/V).           

 

     prenatal      2021      Yes       [id number] 1{tbl}      Take 1           U

nivers



     multivitami      1-23                               tablet by           ity

 of



     n (PRENATAL      00:00:                               mouth           [place]



     VITAMIN)      00                                 daily.           Medical



     tablet                                                        Branch

 

     proMETHazin      2021      Yes       [id number] 12.5mg      Take 0.5       

    [facility]



     e 25 mg      1-23                               tablets by           ity of



     tablet      00:00:                               mouth           [place]



               00                                 every 4           Medical



                                                  (four)           Branch



                                                  hours as           



                                                  needed for           



                                                  Nausea and           



                                                  Vomiting           



                                                  (N/V).           

 

     Nitrofurant      2021- Yes       [id number] 100mg      Take 1         

  [facility]



     oin&Nit.                                capsule by           ity 

of



     Macrocryst      00:00: 05:59                          mouth 2           [facility]

as



     100 mg      00   :00                           (two)           Medical



     capsule                                         times           Branch



                                                  daily for           



                                                  7 days.           

 

     Nitrofurant      2021- Yes       [id number] 100mg      Take 1         

  [facility]



     oin&Nit.                                capsule by           ity 

of



     Macrocryst      00:00: 05:59                          mouth 2           [facility]

as



     100 mg      00   :00                           (two)           Medical



     capsule                                         times           Branch



                                                  daily for           



                                                  7 days.           







Vital Signs







             Vital Name   Observation Time Observation Value Comments     Source

 

             Systolic blood 2021 20:06:00 128 mm[Hg]                [facility]

sity of



             pressure                                            [facility]



                                                                 [facility]

 

             Diastolic blood 2021 20:06:00 79 mm[Hg]                 Unive

rsity of



             pressure                                            [facility]

 

             Heart rate   2021 20:06:00 93 /min                   [facility]

 

             Body temperature 2021 20:06:00 36.72 Tamika                 [facility]

 

             Respiratory rate 2021 20:06:00 16 /min                   [facility]

 

             Body height  2021 20:06:00 147.3 cm                  [facility]

 

             Body weight  2021 20:06:00 35.426 kg                 [facility]

 

             BMI          2021 20:06:00 16.32 kg/m2               [facility]







Procedures







                Procedure       Date / Time Performed Performing Clinician Sour

e

 

                IMMTRAC2 CONSENT 2021 06:01:00 Doctor Unassigned, No Anuj

[facility]

 

                POCT PREGNANCY TEST 2021 20:09:00 Shakila Acosta

[facility]

 

                POCT URINALYSIS W/O 2021 20:09:00 Shakila Acosta

[facility]







Encounters







        Start   End     Encounter Admission Attending Care    Care    Encounter 

Source



        Date/Time Date/Time Type    Type    Clinicians Facility Department ID   

   

 

        2021 Outpatient R       AK[place], [facility]    [id number]

3A-20 Univers



        14:00:00 14:00:00                 WILBERT                 [id number]  ity o

f



                                                                        [facility]

 

        2021 Outpatient R       [place], [facility]    [id number]

[id number] Univers



        14:00:00 14:00:00                 WILBERT                         ity o

f



                                                                        [facility]

 

        2021-12-15 2021-12-15 Outpatient P               [facility]    [id number]

[id number] Univers



        10:15[facility]00 10:15:00                                                 [facility]

 

        2021-12-15 2021-12-15 Outpatient R               [facility]    [id number]

-[id number] Univers



        10:00:00 10:00:00                                         [id number]  [facility]

 

        2021 Orders          Doctor PASCUAL    [id number]

[id number] Univers



        00:00:00 00:00:00 Only            Unassigned, ML   350.1.13.10       

  ity of



                                        [facility] 4.2.7.2.686         [facility]

as



                                                        [id number]         [address]

 

        2021 Telephone         [facility]    [id number]

[id number] Univers



        00:00:00 00:00:00                 Shakila [facility]  350.1.13.10         it

y of



                                                REGIONAL 4.2.7.2.686         [facility]

as



                                                MATERNAL [id number]         Med

ical



                                                & CHILD 124             [facility]                 

 

        2021 Initial         [facility]    [id number]

[id number] 

[id number]                                Univers



        14:07:17 14:37:17 Prenatal         Shakila Acsota [facility]  350.1.13.1

0         ity of



                        Visit                   REGIONAL [id number]         [facility]

as



                                                MATERNAL [id number]         Med

ical



                                                & CHILD 107             [facility]                 

 

        2021 Outpatient R       KARLA [facility]    [id number]

[id number] Univers



        14:00:00 14:00:00                 SHAKILA ennis [facility]







Results







           Test Description Test Time  Test Comments Results    Result Comments 

Source









                    POCT PREGNANCY TEST 2021 20:09:00 









                      Test Item  Value      Reference Range Interpretation Comme

nts









             POCT PREG (test code = 1605) Positive                              

 

 

             On board controls acceptable with C Line (test code = 3574) Yes    

                                

 

             POCT PREG LOT # (test code = 3575)                                 

       

 

             POCT PREG TEST  DATE (test code = 3576)                      

                  



[facility] [id number]
 20:09:00





             Test Item    Value        Reference Range Interpretation Comments

 

             POCT PH U (test code = 3254) 5 mg/dl      5-8                      

 

 

             POCT U LEUK EST (test code =              Negative - Negative      

        



             3263)                                               

 

             POCT U NIT (test code = 3262) positive     Negative - Negative     

         

 

             POCT U PROT (test code = 3259) trace        Negative - Negative    

          

 

             POCT U GLU (test code = 3256) negative     Negative - Negative     

         

 

             POCT U KETONE (test code = 3258) negative     Negative - Negative  

            

 

             POCT U BLD (test code = 3257) negative     Negative - Negative     

         



[facility]

## 2021-12-19 ENCOUNTER — HOSPITAL ENCOUNTER (EMERGENCY)
Dept: HOSPITAL 97 - ER | Age: 26
LOS: 1 days | Discharge: HOME | End: 2021-12-20
Payer: COMMERCIAL

## 2021-12-19 DIAGNOSIS — O03.9: Primary | ICD-10-CM

## 2021-12-19 PROCEDURE — 81025 URINE PREGNANCY TEST: CPT

## 2021-12-19 PROCEDURE — 86900 BLOOD TYPING SEROLOGIC ABO: CPT

## 2021-12-19 PROCEDURE — 86901 BLOOD TYPING SEROLOGIC RH(D): CPT

## 2021-12-19 PROCEDURE — 82947 ASSAY GLUCOSE BLOOD QUANT: CPT

## 2021-12-19 PROCEDURE — 85025 COMPLETE CBC W/AUTO DIFF WBC: CPT

## 2021-12-19 PROCEDURE — 36415 COLL VENOUS BLD VENIPUNCTURE: CPT

## 2021-12-19 PROCEDURE — 76815 OB US LIMITED FETUS(S): CPT

## 2021-12-19 PROCEDURE — 99283 EMERGENCY DEPT VISIT LOW MDM: CPT

## 2021-12-19 PROCEDURE — 81003 URINALYSIS AUTO W/O SCOPE: CPT

## 2021-12-19 PROCEDURE — 80048 BASIC METABOLIC PNL TOTAL CA: CPT

## 2021-12-19 PROCEDURE — 84702 CHORIONIC GONADOTROPIN TEST: CPT

## 2021-12-19 NOTE — XMS REPORT
Continuity of Care Document

                          Created on:2021



Patient:SHAKILA ANDRADE

Sex:Female

:1995

External Reference #:[id number]





Demographics







                          Address                   [address] [place] [address]

 

                          Home Phone                [phone]

 

                          Mobile Phone              [phone]

 

                          Email Address             NONE

 

                          Preferred Language        es

 

                          Marital Status            Unknown

 

                          Anglican Affiliation     Unknown

 

                          Race                      Unknown

 

                          Additional Race(s)        White

 

                          Ethnic Group               or 









Author







                          Organization              [facility]

t

 

                          Address                   [facility]



                                                    [place] [address]

 

                          Phone                     [phone]









Support







                Name            Relationship    Address         Phone

 

                Isra           Relative        Unavailable     [phone]









Care Team Providers







                    Name                Role                Phone

 

                    Pcp,  Does Not Have A Primary Care Physician [phone]

 

                    Trimester,  Res-1st Attending Clinician Unavailable

 

                    JAMEL Ferrari MD   Attending Clinician [phone]









Payers







           Payer Name Policy Type Policy Number Effective Date Expiration Date S

ource







Problems







       Condition Condition Condition Status Onset  Resolution Last   Treating Co

mments 

Source



       Name   Details Category        Date   Date   Treatment Clinician        



                                                 Date                 

 

       No known No known Disease                                           Unive

rs



       active active                                                  ity of



       problems problems                                                  [facility]







Allergies, Adverse Reactions, Alerts

This patient has no known allergies or adverse reactions.



Social History







           Social Habit Start Date Stop Date  Quantity   Comments   Source

 

           ASSERTION  2021-10-07            Pregnant              [facility]



                      00:00:00                                    [facility]

 

           Exposure to                       Not sure              [facility]



           SARS-CoV-2                                             [facility]



           (event)                                                [place]

 

           Alcohol intake 2021 Ex-drinker            [facility]



                      00:00:00   00:00:00   (finding)             [facility]

 

           Tobacco use and 2021 Never used            [facility]

y of



           exposure   00:00:00   00:00:00                         [facility]

 

           Sex Assigned At 1995                       [facility]

y of



           Birth      00:00:00   00:00:00                         [facility]









                Smoking Status  Start Date      Stop Date       Source

 

                Never smoker                                    [facility]







Medications







       Ordered Filled Start  Stop   Current Ordering Indication Dosage Frequency

 Signature

                    Comments            Components          Source



     Medication Medication Date Date Medication? Clinician                (SIG) 

          



     Name Name                                                   

 

     prenatal      2021      Yes       [id number] 1{tbl}      Take 1           U

nivers



     multivitami      1-23                               tablet by           itloly (PRENATAL      00:00:                               mouth           [place]



     VITAMIN)      00                                 daily.           Medical



     tablet                                                        Branch

 

     proMETHazin      2021      Yes       [id number] 12.5mg      Take 0.5       

    [facility]



     e 25 mg      1-23                               tablets by           ity of



     tablet      00:00:                               mouth           [place]



               00                                 every 4           Medical



                                                  (four)           Branch



                                                  hours as           



                                                  needed for           



                                                  Nausea and           



                                                  Vomiting           



                                                  (N/V).           







Vital Signs







             Vital Name   Observation Time Observation Value Comments     Source

 

             Body temperature 2021 14:51:00 36.94 Tamika                 [facility]

 

             Respiratory rate 2021 14:51:00 18 /min                   [facility]

 

             Body height  2021 14:51:00 147.3 cm                  [facility]

 

             Body weight  2021 14:51:00 35.108 kg                 [facility]

 

             BMI          2021 14:51:00 16.18 kg/m2               [facility]

 

             Systolic blood 2021 14:51:00 128 mm[Hg]                Univer

sity [facility]

 

             Diastolic blood 2021 14:51:00 76 mm[Hg]                 [facility]

 

             Heart rate   2021 14:51:00 77 /min                   [facility]







Procedures

This patient has no known procedures.



Encounters







        Start   End     Encounter Admission Attending Care    Care    Encounter 

Source



        Date/Time Date/Time Type    Type    Clinicians Facility Department ID   

   

 

          2021 Routine             Trimester, [facility]

 [facility] 

1.2.840.114               [id number]                  [facility]



        09:45:00 09:45:00 Prenatal         Ferrari Shelly [facility] 350.1.13

.10         ity of



                        Visit                   CLINICS 4.2.7.2.686         [place]

s



                                                        [phone]         Medi

mando



                                                        113             Branch







Results

This patient has no known results.

## 2021-12-20 VITALS — DIASTOLIC BLOOD PRESSURE: 64 MMHG | SYSTOLIC BLOOD PRESSURE: 113 MMHG | OXYGEN SATURATION: 100 %

## 2021-12-20 LAB
BUN BLD-MCNC: 15 MG/DL (ref 7–18)
GLUCOSE SERPLBLD-MCNC: 111 MG/DL (ref 74–106)
HCG SERPL-ACNC: 9255 MIU/ML (ref 1–3)
HCT VFR BLD CALC: 36 % (ref 36–45)
LYMPHOCYTES # SPEC AUTO: 3.2 K/UL (ref 0.7–4.9)
PMV BLD: 10 FL (ref 7.6–11.3)
POTASSIUM SERPL-SCNC: 3.6 MMOL/L (ref 3.5–5.1)
RBC # BLD: 3.8 M/UL (ref 3.86–4.86)
SP GR UR: 1.01 (ref 1–1.03)

## 2021-12-20 NOTE — EDPHYS
Physician Documentation                                                                           

 [facility]                                                                 

Name: Ann Marie Hernandez                                                                           

Age: 26 yrs                                                                                       

Sex: Female                                                                                       

: 1995                                                                                   

MRN: [id number]                                                                                   

Arrival Date: 2021                                                                          

Time: 23:01                                                                                       

Account#: [id number]                                                                            

Bed 5                                                                                             

Private MD:                                                                                       

ED Physician Quentin Moore                                                                      

HPI:                                                                                              

                                                                                             

23:25 This 26 yrs old  Female presents to ER via Wheelchair with complaints of        pm1 

      Vaginal Bleeding, + Preg <12wks, Abdominal Pain.                                            

23:25 The patient presents to the emergency department with vaginal bleeding.                 pm1 

23:25 The estimated gestational age is 9 weeks. Pregnancy course: Prenatal care: private OB   pm1 

      physician, [facility], Ultrasound: the patient had an ultrasound, on 2021, which showed blighted ovum. Associated signs and symptoms: Pertinent positives:       

      Abdominal cramping suprapubic area. The patient has been recently seen at the               

      [facility] Emergency Department, last week. Patient presenting       

      for vaginal bleeding for the past 2 weeks worse today.                                      

                                                                                                  

OB/GYN:                                                                                           

23:17 LMP 2021, Pregnancy Verified, EDC 2022, Gestational age from LMP: 15 weeks 5    lp1 

      days                                                                                        

23:25  3, Full Term 2                                                                  pm1 

                                                                                                  

Historical:                                                                                       

- Allergies:                                                                                      

23:18 No Known Allergies;                                                                     lp1 

- Home Meds:                                                                                      

23:18 None [Active];                                                                          lp1 

- PMHx:                                                                                           

23:18 None;                                                                                   lp1 

- PSHx:                                                                                           

23:18 None;                                                                                   lp1 

                                                                                                  

- Immunization history:: Adult Immunizations unknown.                                             

- Social history:: Smoking status: Patient denies any tobacco usage or history of.                

                                                                                                  

                                                                                                  

ROS:                                                                                              

23:25 Constitutional: Negative for fever, chills, and weight loss, Cardiovascular: Negative   pm1 

      for chest pain, palpitations, and edema, Respiratory: Negative for shortness of breath,     

      cough, wheezing, and pleuritic chest pain.                                                  

23:25 Back: Negative for injury and pain.                                                         

23:25 MS/Extremity: Negative for injury and deformity, Skin: Negative for injury, rash, and       

      discoloration, Neuro: Negative for headache, weakness, numbness, tingling, and seizure.     

23:25 Abdomen/GI: Positive for abdominal pain, abdominal cramps, of the suprapubic area,          

      Negative for nausea, vomiting, and diarrhea.                                                

23:25 : Positive for vaginal bleeding, Negative for urinary symptoms.                           

23:25 All other systems are negative.                                                             

                                                                                                  

Exam:                                                                                             

23:25 Constitutional:  This is a well developed, well nourished patient who is awake, alert,  pm1 

      and in no acute distress. Head/Face:  Normocephalic, atraumatic.                            

23:25 Back:  No spinal tenderness.  No costovertebral tenderness.  Full range of motion.          

      Skin:  Warm, dry with normal turgor.  Normal color with no rashes, no lesions, and no       

      evidence of cellulitis. MS/ Extremity:  Pulses equal, no cyanosis.  Neurovascular           

      intact.  Full, normal range of motion.                                                      

23:25 Cardiovascular: Exam negative for  acute changes, Rate: normal, Rhythm: regular,            

      Pulses: no pulse deficits are appreciated.                                                  

23:25 Respiratory: Exam negative for  acute changes, respiratory distress, shortness of           

      breath, Breath sounds: are clear throughout.                                                

23:25 Abdomen/GI: Inspection: abdomen appears normal, Palpation: soft, in all quadrants, mild     

      abdominal tenderness, in the suprapubic area.                                               

23:25 Neuro: Orientation: is normal, Mentation: is normal, Motor: is normal, moves all fours.     

                                                                                                  

Vital Signs:                                                                                      

23:25  / 96; Pulse 100; Resp 21; Pulse Ox 100% on R/A; Weight 45.36 kg; Height 5 ft.    5 

      (152.40 cm); Pain 10/10;                                                                    

                                                                                             

01:10  / 56; Pulse 77; Resp 18 S; Pulse Ox 99% on R/A;                                  as6 

02:12  / 64; Pulse 71; Resp 18 S; Pulse Ox 100% on R/A;                                 5 

                                                                                             

23:25 Body Mass Index 19.53 (45.36 kg, 152.40 cm)                                             5 

                                                                                                  

MDM:                                                                                              

                                                                                             

23:25 Patient medically screened.                                                             pm1 

23:25 ED course: IUP suspected blighted ovum per ultrasound 2021.                       pm1 

                                                                                             

00:46 Data reviewed: vital signs. Data interpreted: Pulse oximetry: on room air is 100 %.     pm1 

      Interpretation: normal.                                                                     

01:08 Counseling: I had a detailed discussion with the patient and/or guardian regarding: the pm1 

      historical points, exam findings, and any diagnostic results supporting the                 

      discharge/admit diagnosis, lab results, radiology results, the need for outpatient          

      follow up, to return to the emergency department if symptoms worsen or persist or if        

      there are any questions or concerns that arise at home.                                     

                                                                                                  

                                                                                             

23:21 Order name: Abo/rh Typing; Complete Time: 00:45                                         pm1 

                                                                                             

23:21 Order name: Basic Metabolic Panel; Complete Time: 00:45                                 pm1 

                                                                                             

23:21 Order name: CBC with Diff; Complete Time: 00:20                                         pm1 

                                                                                             

23:21 Order name: Quantitative Hcg; Complete Time: 00:45                                      pm1 

                                                                                             

00:26 Order name: Urine Dipstick-Ancillary; Complete Time: 00:45                              EDMS

                                                                                             

00:28 Order name: Urine Pregnancy--Ancillary (enter results)                                  tt3 

                                                                                             

23:21 Order name: IV Saline Lock; Complete Time: 23:33                                        pm1 

                                                                                             

23:21 Order name: Labs collected and sent; Complete Time: 23:33                               pm1 

                                                                                             

23:21 Order name: NPO; Complete Time: 23:33                                                   pm1 

                                                                                             

23:21 Order name: Urine Dipstick-Ancillary (obtain specimen); Complete Time: 00:51            pm1 

                                                                                             

00:11 Order name: OB Limited                                                                  EDMS

                                                                                             

02:16 Order name: Glucose, Ancillary Testing; Complete Time: 02:43                            EDMS

                                                                                             

23:21 Order name: Urine Pregnancy Test (obtain specimen); Complete Time: 23:34                pm1 

                                                                                                  

Administered Medications:                                                                         

                                                                                             

23:53 Drug: Tylenol 500 mg Route: PO;                                                         as6 

                                                                                             

01:10 Follow up: Response: No adverse reaction                                                as6 

                                                                                                  

                                                                                                  

Disposition:                                                                                      

04:03 Co-signature as Attending Physician, Quentin Moore MD.                                 mh7 

                                                                                                  

Disposition Summary:                                                                              

21 01:09                                                                                    

Discharge Ordered                                                                                 

      Location: Home                                                                          pm1 

      Problem: new                                                                            pm1 

      Symptoms: have improved                                                                 pm1 

      Condition: Stable                                                                       pm1 

      Diagnosis                                                                                   

        - Spontaneous                                                                 pm1 

      Followup:                                                                               pm1 

        - With: Emergency Department                                                               

        - When: As needed                                                                          

        - Reason: Worsening of condition                                                           

      Followup:                                                                               pm1 

        - With: Private Physician                                                                  

        - When: 2 - 3 days                                                                         

        - Reason: Recheck today's complaints, Continuance of care, Re-evaluation by your           

      physician                                                                                   

      Discharge Instructions:                                                                     

        - Discharge Summary Sheet                                                             pm1 

        - Miscarriage                                                                         pm1 

      Forms:                                                                                      

        - Medication Reconciliation Form                                                      pm1 

        - Thank You Letter                                                                    pm1 

        - Antibiotic Education                                                                pm1 

        - Prescription Opioid Use                                                             pm1 

Signatures:                                                                                       

Dispatcher [employer]                           EDMS                                                 

Verenice Jacobs RN                         RN   lp1                                                  

Brayan Hou, NP                    NP   pm1                                                  

Quentin Moore MD MD   mh7                                                  

Gene Perdomo RN                      RN   as6                                                  

                                                                                                  

Corrections: (The following items were deleted from the chart)                                    

00:10  23:27 Transvaginal Ob+US.RAD.BRZ ordered. EDMS                                    EDMS

                                                                                             

01:14 01:09 Threatened  pm1                                                           pm1 

                                                                                                  

**************************************************************************************************

## 2021-12-20 NOTE — ER
Nurse's Notes                                                                                     

 [facility]                                                                 

Name: Ann Marie Hernandez                                                                           

Age: 26 yrs                                                                                       

Sex: Female                                                                                       

: 1995                                                                                   

MRN: [id number]                                                                                   

Arrival Date: 2021                                                                          

Time: 23:01                                                                                       

Account#: [id number]                                                                            

Bed 5                                                                                             

Private MD:                                                                                       

Diagnosis: Spontaneous                                                                    

                                                                                                  

Presentation:                                                                                     

                                                                                             

23:16 Chief complaint: Spouse and/or significant other states: Vaginal bleeding that began 2  lp1 

      weeks ago, significantly more bleeding tonight about 3 hours ago with pelvic pain.          

      Coronavirus screen: At this time, the client does not indicate any symptoms associated      

      with coronavirus-19. Ebola Screen: No symptoms or risks identified at this time. Risk       

      Assessment: Do you want to hurt yourself or someone else? Patient reports no desire to      

      harm self or others. Onset of symptoms was 2021.                               

23:16 Method Of Arrival: Wheelchair                                                           lp1 

23:16 Acuity: CHACHA 2                                                                           lp1 

                                                                                             

01:12 Initial Sepsis Screen: Does the patient meet any 2 criteria? No. Patient's initial      as6 

      sepsis screen is negative. Does the patient have a suspected source of infection? No.       

      Patient's initial sepsis screen is negative.                                                

                                                                                                  

Triage Assessment:                                                                                

01:11 General: Appears uncomfortable. Pain: Complains of pain in abdomen Quality of pain is   as6 

      described as crampy. : Parent/caregiver report the patient having vaginal bleeding        

      that is bright red heavy flow.                                                              

                                                                                                  

OB/GYN:                                                                                           

                                                                                             

23:17 LMP 2021, Pregnancy Verified, EDC 2022, Gestational age from LMP: 15 weeks 5    lp1 

      days                                                                                        

23:25  3, Full Term 2                                                                  pm1 

                                                                                                  

Historical:                                                                                       

- Allergies:                                                                                      

23:18 No Known Allergies;                                                                     lp1 

- Home Meds:                                                                                      

23:18 None [Active];                                                                          lp1 

- PMHx:                                                                                           

23:18 None;                                                                                   lp1 

- PSHx:                                                                                           

23:18 None;                                                                                   lp1 

                                                                                                  

- Immunization history:: Adult Immunizations unknown.                                             

- Social history:: Smoking status: Patient denies any tobacco usage or history of.                

                                                                                                  

                                                                                                  

Screenin:27 Abuse screen: Denies threats or abuse. Denies injuries from another. Nutritional        sm5 

      screening: No deficits noted. Tuberculosis screening: No symptoms or risk factors           

      identified. Fall Risk No fall in past 12 months (0 pts). No secondary diagnosis (0          

      pts). IV access (20 points). Ambulatory Aid- None/Bed Rest/Nurse Assist (0 pts). Gait-      

      Normal/Bed Rest/Wheelchair (0 pts) Mental Status- Oriented to own ability (0 pts).          

      Total Arteaga Fall Scale indicates No Risk (0-24 pts).                                        

                                                                                                  

Assessment:                                                                                       

23:38 General: Behavior is restless, pt is, moaning, grimacing, and guarding abdomen .        as6 

                                                                                             

00:30 Reassessment: No changes from previously documented assessment.                         sm5 

01:30 Reassessment: No changes from previously documented assessment.                         sm5 

02:10 General: upon discharge pt feeling faint, pt escorted back to room, reassessed by       [facility] 

      josefa walker 120, vss .                                                                    

                                                                                                  

Vital Signs:                                                                                      

                                                                                             

23:25  / 96; Pulse 100; Resp 21; Pulse Ox 100% on R/A; Weight 45.36 kg; Height 5 ft.    5 

      (152.40 cm); Pain 10/10;                                                                    

                                                                                             

01:10  / 56; Pulse 77; Resp 18 S; Pulse Ox 99% on R/A;                                  as6 

02:12  / 64; Pulse 71; Resp 18 S; Pulse Ox 100% on R/A;                                 5 

                                                                                             

23:25 Body Mass Index 19.53 (45.36 kg, 152.40 cm)                                             [facility] 

                                                                                                  

ED Course:                                                                                        

                                                                                             

23:01 Patient arrived in ED.                                                                  cf2 

23:12 Gene Perdomo, NASIM is Primary Nurse.                                                    as6 

23:17 Triage completed.                                                                       lp1 

23:20 Brayan Hou NP is PHCP.                                                           pm1 

23:20 Quentin Moore MD is Attending Physician.                                             pm1 

                                                                                             

00:11 OB Limited In Process Unspecified.                                                      EDMS

01:12 Arm band placed on.                                                                     as6 

01:12 Placed in gown. Bed in low position. Call light in reach. Side rails up X 1. Adult w/   as6 

      patient. Pulse ox on. NIBP on.                                                              

01:51 No provider procedures requiring assistance completed. IV discontinued.                 sm5 

02:06 Primary Nurse role handed off by Gene Perdomo, NASIM                                     lp1 

02:10 Shelly Jones RN is Primary Nurse.                                                      [facility] 

                                                                                                  

Administered Medications:                                                                         

                                                                                             

23:53 Drug: Tylenol 500 mg Route: PO;                                                         as6 

                                                                                             

01:10 Follow up: Response: No adverse reaction                                                as6 

                                                                                                  

                                                                                                  

Outcome:                                                                                          

01:09 Discharge ordered by MD.                                                                pm1 

01:52 Discharged to home ambulatory, with family.                                             sm5 

01:52 Condition: stable                                                                           

01:52 Discharge instructions given to patient, family, Instructed on discharge instructions,      

      follow up and referral plans. Demonstrated understanding of instructions, follow-up         

      care.                                                                                       

01:52 Patient left the ED.                                                                    5 

02:20 Patient left the ED.                                                                    as6 

                                                                                                  

Signatures:                                                                                       

Dispatcher [employer]                           EDMS                                                 

Verenice Jacobs, RN                         RN   lp1                                                  

Brayan Hou, NP                    NP   pm1                                                  

Neela Camara                             cf2                                                  

Gene Perdomo RN                      RN   as6                                                  

Shelly Jones RN                        RN   sm5                                                  

                                                                                                  

**************************************************************************************************

## 2021-12-20 NOTE — RAD REPORT
EXAM DESCRIPTION:  US - OB Limited - 12/20/2021 12:09 am

 

CLINICAL HISTORY:  VAGINAL BLEEDING

Fetal age.

 

COMPARISON:  Transvaginal OB dated 12/13/2021; Transvaginal OB dated 11/11/2021

 

FINDINGS:  Re- demonstrated oval gestational sac with mean sac diameter of 12 millimeters which would
 be consistent with 6 weeks 0 day. No fetal pole identified or heart tone. The gestational sac was or
iginally identified on the 11/11/2021. Small volume of free fluid is noted. The left ovary demonstrat
es vascular flow. The right ovary was not visualized. The left ovary volume is 4.4 cc.

 

IMPRESSION:  Findings remain consistent with failed first trimester pregnancy.